# Patient Record
Sex: MALE | URBAN - METROPOLITAN AREA
[De-identification: names, ages, dates, MRNs, and addresses within clinical notes are randomized per-mention and may not be internally consistent; named-entity substitution may affect disease eponyms.]

---

## 2019-08-08 ENCOUNTER — OFFICE VISIT (OUTPATIENT)
Dept: FAMILY MEDICINE CLINIC | Age: 34
End: 2019-08-08

## 2019-08-08 VITALS
OXYGEN SATURATION: 98 % | WEIGHT: 136 LBS | RESPIRATION RATE: 16 BRPM | HEART RATE: 71 BPM | DIASTOLIC BLOOD PRESSURE: 81 MMHG | TEMPERATURE: 98.7 F | HEIGHT: 67 IN | BODY MASS INDEX: 21.35 KG/M2 | SYSTOLIC BLOOD PRESSURE: 131 MMHG

## 2019-08-08 DIAGNOSIS — Z13.220 SCREENING, LIPID: ICD-10-CM

## 2019-08-08 DIAGNOSIS — Z13.29 SCREENING FOR THYROID DISORDER: ICD-10-CM

## 2019-08-08 DIAGNOSIS — Z76.89 ENCOUNTER TO ESTABLISH CARE: ICD-10-CM

## 2019-08-08 DIAGNOSIS — H93.19 TINNITUS, UNSPECIFIED LATERALITY: Primary | ICD-10-CM

## 2019-08-08 DIAGNOSIS — E55.9 VITAMIN D DEFICIENCY: ICD-10-CM

## 2019-08-08 DIAGNOSIS — R14.2 BURPING: ICD-10-CM

## 2019-08-08 RX ORDER — HYDROGEN PEROXIDE 3 %
SOLUTION, NON-ORAL MISCELLANEOUS
COMMUNITY

## 2019-08-08 RX ORDER — RANITIDINE 150 MG/1
150 TABLET, FILM COATED ORAL 2 TIMES DAILY
Qty: 60 TAB | Refills: 0 | Status: SHIPPED | OUTPATIENT
Start: 2019-08-08

## 2019-08-08 NOTE — PROGRESS NOTES
Chief Complaint   Patient presents with   [facility] Establish Care    Gas     Pt c/o sebastian ho     1. Have you been to the ER, urgent care clinic since your last visit? Hospitalized since your last visit? No    2. Have you seen or consulted any other health care providers outside of the [address] [place] [address] since your last visit? Include any pap smears or colon screening.  No

## 2019-08-08 NOTE — PROGRESS NOTES
ESTABLISH CARE VISIT    SUBJECTIVE:     Chief Complaint   Patient presents with        Gas     Pt c/o burdeniz alot       HPI: 29 y.o. [facility] male  has no past medical history on file. is here for the above chief complaint(s). Burping  Patient started having burping a few years ago. This has worsened overt the last few years. Patient has tried pepto bismal tablets as well as nexium intermittently, not daily. Patient notices burping in the morning and at night. Patient denies any abdominal pain, n/v/d, melena, BRBPR. Groin Pain  Patient endorses left groin pain on occasion. Patient states that groin pain started a few months ago and occurs intermittently when he lifts heavy objects. Pain at its max is 4/10, worse with lifting, better with rest. Pain is aching/throbbing pain, does not radiate anywhere and he denies any numbnes or tingling in his leg. Patient denies any bulging, of the groin, or other symptoms. Denies headache, lightheadedness, dizziness, vision changes, chest pain at rest or with exertion, rapid/irregular heart rate, shortness of breath at rest or with exertion, cough, abdominal pain, leg swelling, leg pain. Health Maintenance:    Eye -  no complaints, last eye exam: needs eye exam, will give names to schedule  Oral Health -  no complaints, last exam:   Hearing -  no complaints, needs hearing test, +tinnnitus,+hearing changes  U/A -  no UTI sxs. Cardiac- denies history, denies chest pain. Testicular Exam - does self-exams, declines exam today. Prostate - no prostate cancer in the family, patient is low risk, screen at 50   Colonoscopy - no colon cancer in the family, screen at 48       Review of Systems   Constitutional: Negative for chills, fever, malaise/fatigue and weight loss. HENT: Positive for tinnitus (right ear). Negative for congestion, ear discharge, ear pain, hearing loss, nosebleeds and sore throat.     Eyes: Negative for blurred vision, double vision and pain.   Respiratory: Negative for cough, hemoptysis, sputum production, shortness of breath, wheezing and stridor. Cardiovascular: Negative for chest pain, palpitations, orthopnea, claudication and leg swelling. Gastrointestinal: Positive for abdominal pain and heartburn. Negative for blood in stool, constipation, diarrhea, melena, nausea and vomiting.        +burping     Genitourinary: Negative for dysuria, frequency, hematuria and urgency. Musculoskeletal: Negative for back pain, falls, joint pain, myalgias and neck pain. Left groin pain     Skin: Negative for itching and rash. Neurological: Negative for dizziness, tingling, seizures and headaches. @[facility]@  Current Outpatient Medications   Medication Sig    esomeprazole (NEXIUM) 20 mg capsule Take  by mouth daily as needed. No current facility-administered medications for this visit. Health Maintenance   Topic Date Due    DTaP/Tdap/Td series (1 - Tdap) 03/01/2006    Influenza Age 5 to Adult  08/01/2019    Pneumococcal 0-64 years  Aged Out       Medications and Allergies: Reviewed and confirmed in the chart    Past Medical Hx: Reviewed and confirmed in the chart  History reviewed. No pertinent past medical history. There is no problem list on file for this patient. Family Hx, Surgical Hx, Social Hx: Reviewed and updated in EMR    OBJECTIVE:  Vitals:    08/08/19 1303   BP: 131/81   Pulse: 71   Resp: 16   Temp: 98.7 °F (37.1 °C)   TempSrc: Oral   SpO2: 98%   Weight: 136 lb (61.7 kg)   Height: 5' 7\" (1.702 m)       BP Readings from Last 3 Encounters:   08/08/19 131/81     Wt Readings from Last 3 Encounters:   08/08/19 136 lb (61.7 kg)       Physical Exam   Constitutional: He is oriented to person, place, and time and well-developed, well-nourished, and in no distress. No distress. Neck: Normal range of motion. Neck supple. No thyromegaly present.    Cardiovascular: Normal rate, regular rhythm, normal heart sounds and intact distal pulses. Exam reveals no gallop and no friction rub. No murmur heard. Pulmonary/Chest: Effort normal and breath sounds normal. No respiratory distress. He has no wheezes. He has no rales. He exhibits no tenderness. Abdominal: Normal appearance and bowel sounds are normal. He exhibits no shifting dullness. There is no tenderness. Hernia confirmed negative in the right inguinal area and confirmed negative in the left inguinal area. Genitourinary: Penis normal. Penis exhibits no lesions and no edema. No discharge found. Musculoskeletal: He exhibits no edema, tenderness or deformity. Lymphadenopathy:     He has no cervical adenopathy. Neurological: He is alert and oriented to person, place, and time. Skin: Skin is warm and dry. He is not diaphoretic. Vitals reviewed. Nursing Notes Reviewed    ASSESSMENT AND PLAN  Diagnoses and all orders for this visit:    1. Tinnitus, unspecified laterality  -     REFERRAL TO AUDIOLOGY    2. Burping  -     H PYLORI ABS, IGA AND IGG; Future  -     REFERRAL TO GASTROENTEROLOGY  -     raNITIdine (ZANTAC) 150 mg tablet; Take 1 Tab by mouth two (2) times a day. 3. Screening, lipid  -     LIPID PANEL; Future    4. Screening for thyroid disorder  -     TSH 3RD GENERATION; Future  -     T4, FREE; Future    5. Vitamin D deficiency  -     VITAMIN D, 25 HYDROXY; Future    6. Encounter to establish care  -     CBC WITH AUTOMATED DIFF; Future  -     METABOLIC PANEL, COMPREHENSIVE; Future      I have discussed the diagnosis with the patient and the intended plan as seen in the above orders. The patient has received an after-visit summary and questions were answered concerning future plans. I have discussed medication side effects and warnings with the patient as well. I have reviewed the plan of care with the patient, accepted their input and they are in agreement with the treatment goals.     More than 50% of this 30 min visit was spent counseling the patient face to face about etiology and treatment of health conditions outlined in assessment and plan        Vielka De Paz [address], [address] [place] [address], [address] [place] [address] [phone]  [facility] -   [phone]

## 2019-08-09 ENCOUNTER — LAB ONLY (OUTPATIENT)
Dept: FAMILY MEDICINE CLINIC | Age: 34
End: 2019-08-09

## 2019-08-09 DIAGNOSIS — Z76.89 ENCOUNTER TO ESTABLISH CARE: Primary | ICD-10-CM

## 2019-08-09 NOTE — PROGRESS NOTES
Patient presents for lab draw ordered by:    Ordering Provider:  Edward Barney Department/Practice:  [facility]  Phone:  [phone]  Date Ordered:  8/8/19    The following labs were drawn and sent to [employer]  by Jaz Leslie LPN:    CBC, Lipid Profile, CMP, TSH, 3rd Generation and T4, Vit D, H. Pylori    The following tubes were sent:     3 SST, 1Lav, 0Blue top, 0urine    Left AC cleansed using aseptic technique. Specimen obtained using a 21 gauge butterfly  with 1 attempt. Patient tolerated process well and there was no bleeding noted at site.

## 2019-08-13 LAB
25(OH)D3+25(OH)D2 SERPL-MCNC: 20.7 NG/ML (ref 30–100)
ALBUMIN SERPL-MCNC: 4.8 G/DL (ref 3.5–5.5)
ALBUMIN/GLOB SERPL: 2.1 {RATIO} (ref 1.2–2.2)
ALP SERPL-CCNC: 72 IU/L (ref 39–117)
ALT SERPL-CCNC: 14 IU/L (ref 0–44)
AST SERPL-CCNC: 14 IU/L (ref 0–40)
BASOPHILS # BLD AUTO: 0 X10E3/UL (ref 0–0.2)
BASOPHILS NFR BLD AUTO: 1 %
BILIRUB SERPL-MCNC: 0.4 MG/DL (ref 0–1.2)
BUN SERPL-MCNC: 9 MG/DL (ref 6–20)
BUN/CREAT SERPL: 9 (ref 9–20)
CALCIUM SERPL-MCNC: 9.5 MG/DL (ref 8.7–10.2)
CHLORIDE SERPL-SCNC: 102 MMOL/L (ref 96–106)
CHOLEST SERPL-MCNC: 250 MG/DL (ref 100–199)
CO2 SERPL-SCNC: 24 MMOL/L (ref 20–29)
CREAT SERPL-MCNC: 1 MG/DL (ref 0.76–1.27)
EOSINOPHIL # BLD AUTO: 0.2 X10E3/UL (ref 0–0.4)
EOSINOPHIL NFR BLD AUTO: 5 %
ERYTHROCYTE [DISTWIDTH] IN BLOOD BY AUTOMATED COUNT: 13.8 % (ref 12.3–15.4)
GLOBULIN SER CALC-MCNC: 2.3 G/DL (ref 1.5–4.5)
GLUCOSE SERPL-MCNC: 77 MG/DL (ref 65–99)
H PYLORI IGA SER-ACNC: <9 UNITS (ref 0–8.9)
H PYLORI IGG SER IA-ACNC: <0.8 INDEX VALUE (ref 0–0.79)
HCT VFR BLD AUTO: 47.9 % (ref 37.5–51)
HDLC SERPL-MCNC: 48 MG/DL
HGB BLD-MCNC: 14.7 G/DL (ref 13–17.7)
IMM GRANULOCYTES # BLD AUTO: 0 X10E3/UL (ref 0–0.1)
IMM GRANULOCYTES NFR BLD AUTO: 0 %
LDLC SERPL CALC-MCNC: 168 MG/DL (ref 0–99)
LYMPHOCYTES # BLD AUTO: 1.8 X10E3/UL (ref 0.7–3.1)
LYMPHOCYTES NFR BLD AUTO: 47 %
MCH RBC QN AUTO: 27.2 PG (ref 26.6–33)
MCHC RBC AUTO-ENTMCNC: 30.7 G/DL (ref 31.5–35.7)
MCV RBC AUTO: 89 FL (ref 79–97)
MONOCYTES # BLD AUTO: 0.3 X10E3/UL (ref 0.1–0.9)
MONOCYTES NFR BLD AUTO: 8 %
NEUTROPHILS # BLD AUTO: 1.5 X10E3/UL (ref 1.4–7)
NEUTROPHILS NFR BLD AUTO: 39 %
PLATELET # BLD AUTO: 308 X10E3/UL (ref 150–450)
POTASSIUM SERPL-SCNC: 4.6 MMOL/L (ref 3.5–5.2)
PROT SERPL-MCNC: 7.1 G/DL (ref 6–8.5)
RBC # BLD AUTO: 5.4 X10E6/UL (ref 4.14–5.8)
SODIUM SERPL-SCNC: 140 MMOL/L (ref 134–144)
T4 FREE SERPL-MCNC: 1.51 NG/DL (ref 0.82–1.77)
TRIGL SERPL-MCNC: 171 MG/DL (ref 0–149)
TSH SERPL DL<=0.005 MIU/L-ACNC: 2.06 UIU/ML (ref 0.45–4.5)
VLDLC SERPL CALC-MCNC: 34 MG/DL (ref 5–40)
WBC # BLD AUTO: 3.8 X10E3/UL (ref 3.4–10.8)

## 2019-08-14 ENCOUNTER — TELEPHONE (OUTPATIENT)
Dept: FAMILY MEDICINE CLINIC | Age: 34
End: 2019-08-14

## 2019-08-14 NOTE — TELEPHONE ENCOUNTER
Informed patient that his message was received but that his provider is out of the office until next Wednesday. Informed patient that I would try to have another provider look at his lab results so that he can be notified. Patient verbalized understanding.

## 2019-08-15 NOTE — TELEPHONE ENCOUNTER
Per Dr Idalia Baig, patient was informed that his Vit D level was low and needs Vit D replacement. Suggested that patient take Vit D 50,000 units weekly but patient wants to discuss whether it is vegetarian or not. Informed patient that H.Pylori is negative but that he needs to schedule appt with provider to discuss cholesterol results. Suggested that patient schedule appt to discuss Vit D replacement and other lab results on followup with provider. Patient verbalized understanding and call transferred to [facility] to schedule appt. Routed to provider for review.

## 2019-08-22 ENCOUNTER — OFFICE VISIT (OUTPATIENT)
Dept: FAMILY MEDICINE CLINIC | Age: 34
End: 2019-08-22

## 2019-08-22 VITALS
RESPIRATION RATE: 16 BRPM | OXYGEN SATURATION: 98 % | SYSTOLIC BLOOD PRESSURE: 124 MMHG | WEIGHT: 134 LBS | HEART RATE: 76 BPM | DIASTOLIC BLOOD PRESSURE: 94 MMHG | HEIGHT: 67 IN | TEMPERATURE: 97.5 F | BODY MASS INDEX: 21.03 KG/M2

## 2019-08-22 DIAGNOSIS — E55.9 VITAMIN D DEFICIENCY: ICD-10-CM

## 2019-08-22 DIAGNOSIS — E78.41 ELEVATED LIPOPROTEIN(A): ICD-10-CM

## 2019-08-22 DIAGNOSIS — E86.0 DEHYDRATION: ICD-10-CM

## 2019-08-22 DIAGNOSIS — R42 DIZZINESS: Primary | ICD-10-CM

## 2019-08-22 RX ORDER — MELATONIN
2000 DAILY
Qty: 60 TAB | Refills: 2 | Status: SHIPPED | OUTPATIENT
Start: 2019-08-22

## 2019-08-22 NOTE — PROGRESS NOTES
Patient: Domo Martinez  Date of Service: 8/22/2019   Provider: Heidi Sam PA-C     REASON FOR VISIT:   Chief Complaint   Patient presents with    Dizziness    Palpitations    Results        HISTORY OF PRESENT ILLNESS:   Domo Martinez is a 29 y.o. male who presents to the office for acute care. Dizziness/Palpitations  Patient states that yesterday evening he had dizziness episode. Patient was standing an reaching. Patient states that he felt he needed to sit down. After about a five minutes, this resolved once he sat down. The same episode happened again. Patient denies any LOC, nausea, vomiting. He does endorse loss of appetite. Patient did eat and tolerated well. Patient has not been hydrating well lately. Patient did also feel \"uncomfortable\" on the left side of his chest, but denies any chest pain, palpitations, arm pain, jaw pain. Patient denies this ever happening to him in the past.      ALLERGIES:   No Known Allergies     ACTIVE MEDICAL PROBLEMS:  There is no problem list on file for this patient. MEDICATIONS:   Current Outpatient Medications   Medication Sig Dispense Refill    esomeprazole (NEXIUM) 20 mg capsule Take  by mouth daily as needed.  raNITIdine (ZANTAC) 150 mg tablet Take 1 Tab by mouth two (2) times a day. 60 Tab 0        Review of Systems   Constitutional: Negative for chills, fever, malaise/fatigue and weight loss. Eyes: Negative for blurred vision, double vision and pain. Respiratory: Negative for cough, sputum production, shortness of breath and wheezing. Cardiovascular: Positive for palpitations. Negative for chest pain, orthopnea, claudication and leg swelling. Gastrointestinal: Negative for abdominal pain, constipation, diarrhea, nausea and vomiting. Genitourinary: Negative for dysuria, frequency and urgency. Neurological: Positive for dizziness. Negative for tingling and headaches.          PHYSICAL EXAMINATION:  Visit Vitals  /89 Pulse 74   Temp 97.5 °F (36.4 °C) (Oral)   Resp 16   Ht 5' 7\" (1.702 m)   Wt 134 lb (60.8 kg)   SpO2 98%   BMI 20.99 kg/m²      Body mass index is 20.99 kg/m². Physical Exam   Constitutional: He is oriented to person, place, and time and well-developed, well-nourished, and in no distress. No distress. Neck: Normal range of motion. Neck supple. No thyromegaly present. Cardiovascular: Normal rate, regular rhythm, normal heart sounds and intact distal pulses. Exam reveals no gallop and no friction rub. No murmur heard. Pulmonary/Chest: Effort normal and breath sounds normal. No respiratory distress. He has no wheezes. He has no rales. He exhibits no tenderness. Lymphadenopathy:     He has no cervical adenopathy. Neurological: He is alert and oriented to person, place, and time. He has normal motor skills, normal sensation, normal strength, normal reflexes and intact cranial nerves. He is not agitated and not disoriented. He displays no weakness, no atrophy, no tremor, facial symmetry, normal speech and normal reflexes. No cranial nerve deficit or sensory deficit. He exhibits normal muscle tone. He has a normal Straight Leg Raise Test, a normal Finger-Nose-Finger Test and a normal Romberg Test. Gait normal. Coordination and gait normal. GCS score is 15. Reflex Scores:       Bicep reflexes are 2+ on the right side and 2+ on the left side. Brachioradialis reflexes are 2+ on the right side and 2+ on the left side. Patellar reflexes are 2+ on the right side and 2+ on the left side. Skin: Skin is warm and dry. He is not diaphoretic. Nursing note and vitals reviewed. ASSESSMENT/PLAN:  Diagnoses and all orders for this visit:   Diagnoses and all orders for this visit:    1. Dizziness  -     AMB POC EKG ROUTINE W/ 12 LEADS, INTER & REP    2. Vitamin D deficiency  -     cholecalciferol (VITAMIN D3) 1,000 unit tablet; Take 2 Tabs by mouth daily. -     VITAMIN D, 25 HYDROXY; Future    3. Elevated lipoprotein(a)  -     LIPID PANEL; Future  Advised to decrease high fat dairy products    4. Dehydration  Discussed with patient and wife that he has increased his exercise in the recent weeks and has not increased any fluid intake. He is mildly dehydrated with orthostatic blood pressure. Advised to increase fluid intake. Patient advised to return to clinic if symptoms persist or to ED if they become worse  Patient verbalized understanding to above instructions.   More than 50% of this 30 min visit was spent counseling the patient face to face about etiology and treatment of health conditions outlined in assessment and plan           Nimco Savage PA-C   8/22/2019   1:12 PM

## 2019-08-22 NOTE — PROGRESS NOTES
Chief Complaint   Patient presents with    Dizziness    Palpitations    Results     1. Have you been to the ER, urgent care clinic since your last visit? Hospitalized since your last visit? No    2. Have you seen or consulted any other health care providers outside of the [address] [place] [address] since your last visit? Include any pap smears or colon screening.  No

## 2021-08-07 ENCOUNTER — NURSE TRIAGE (OUTPATIENT)
Dept: OTHER | Facility: CLINIC | Age: 36
End: 2021-08-07

## 2021-08-07 NOTE — TELEPHONE ENCOUNTER
Reason for Disposition   Patient already left for the hospital/clinic. Protocols used: NO CONTACT OR DUPLICATE CONTACT CALL-ADULT-AH  Wife states patient went to [facility] ER last night for vomiting, abdominal pain and Lt arm weakness. Advised wife that information will be documented.

## 2021-09-09 LAB
CHOLEST SERPL-MCNC: 317 MG/DL
GLUCOSE SERPL-MCNC: 100 MG/DL (ref 74–99)
HDLC SERPL-MCNC: 51 MG/DL (ref 40–60)
LDLC SERPL CALC-MCNC: 234 MG/DL (ref 0–100)
TRIGL SERPL-MCNC: 160 MG/DL (ref ?–150)

## 2022-03-11 ENCOUNTER — NURSE TRIAGE (OUTPATIENT)
Dept: OTHER | Facility: CLINIC | Age: 37
End: 2022-03-11

## 2022-03-11 NOTE — TELEPHONE ENCOUNTER
Subjective: Caller states \"abdominal pain\"     Current Symptoms:   Woke up with stomach ache  HA  Vomited once  Has had this in the past, last time in August, went to the ER d/t the amount of vomiting  Was dehydrated and was given fluids  Was given ondansetron and famotidine, still had some leftover, took the pepcid x1 this morning  Vomited 30-40 min later, unsure if the pepcid came up  Intermittent cramping  Drank some water-caused cramping  Currently no pain  Left lower, radiates low down  Had a BM today    Onset: this morning around 0700    Associated Symptoms: NA    Pain Severity: 7/10; cramp; intermittent, HA 2-3/10    Temperature: denies     What has been tried: pepcid    LMP: NA Pregnant: NA    Recommended disposition: America Mancuso 6396 advice provided, patient verbalizes understanding; denies any other questions or concerns; instructed to call back for any new or worsening symptoms. Caller agreeable to treat at home and will call with new/worsening symptoms. Attention Provider: Thank you for allowing me to participate in the care of your patient. The patient was connected to triage in response to symptoms provided. Please do not respond through this encounter as the response is not directed to a shared pool.     Reason for Disposition   Mild abdominal pain    Protocols used: ABDOMINAL PAIN - MALE-ADULT-[place]